# Patient Record
Sex: MALE | Race: WHITE | NOT HISPANIC OR LATINO | Employment: UNEMPLOYED | URBAN - METROPOLITAN AREA
[De-identification: names, ages, dates, MRNs, and addresses within clinical notes are randomized per-mention and may not be internally consistent; named-entity substitution may affect disease eponyms.]

---

## 2017-01-24 ENCOUNTER — TELEPHONE (OUTPATIENT)
Dept: MEDICAL GROUP | Age: 27
End: 2017-01-24

## 2017-01-24 NOTE — TELEPHONE ENCOUNTER
1. Caller Name: niels aguirre                                         Call Back Number: [phone] (home)         Patient approves a detailed voicemail message: no    Patients mom left a message stating that she wants a new order for son to have new labwork since he ate before his last one. I do see labs that are not completed in the computer and asked mom to call back to see if these are the ones that she is needing

## 2017-01-24 NOTE — Clinical Note
January 31, 2017        Jam Youngblood      Please give our office a call at [phone]. We have made several attempts to contact regarding lab work that you need done. Please call at your earliest convence.                      Re Gibbs, Medical Assistant

## 2017-01-25 ENCOUNTER — HOSPITAL ENCOUNTER (OUTPATIENT)
Dept: LAB | Facility: MEDICAL CENTER | Age: 27
End: 2017-01-25
Attending: FAMILY MEDICINE
Payer: COMMERCIAL

## 2017-01-25 DIAGNOSIS — R73.01 ELEVATED FASTING GLUCOSE: ICD-10-CM

## 2017-01-25 DIAGNOSIS — E78.00 PURE HYPERCHOLESTEROLEMIA: ICD-10-CM

## 2017-01-25 DIAGNOSIS — E55.9 VITAMIN D DEFICIENCY: ICD-10-CM

## 2017-01-25 LAB
25(OH)D3 SERPL-MCNC: 24 NG/ML (ref 30–100)
ALBUMIN SERPL BCP-MCNC: 4.9 G/DL (ref 3.2–4.9)
ALBUMIN/GLOB SERPL: 1.8 G/DL
ALP SERPL-CCNC: 57 U/L (ref 30–99)
ALT SERPL-CCNC: 26 U/L (ref 2–50)
ANION GAP SERPL CALC-SCNC: 8 MMOL/L (ref 0–11.9)
AST SERPL-CCNC: 17 U/L (ref 12–45)
BILIRUB SERPL-MCNC: 0.8 MG/DL (ref 0.1–1.5)
BUN SERPL-MCNC: 14 MG/DL (ref 8–22)
CALCIUM SERPL-MCNC: 10 MG/DL (ref 8.5–10.5)
CHLORIDE SERPL-SCNC: 106 MMOL/L (ref 96–112)
CHOLEST SERPL-MCNC: 199 MG/DL (ref 100–199)
CO2 SERPL-SCNC: 27 MMOL/L (ref 20–33)
CREAT SERPL-MCNC: 0.97 MG/DL (ref 0.5–1.4)
GLOBULIN SER CALC-MCNC: 2.8 G/DL (ref 1.9–3.5)
GLUCOSE SERPL-MCNC: 92 MG/DL (ref 65–99)
HDLC SERPL-MCNC: 51 MG/DL
LDLC SERPL CALC-MCNC: 128 MG/DL
POTASSIUM SERPL-SCNC: 4.1 MMOL/L (ref 3.6–5.5)
PROT SERPL-MCNC: 7.7 G/DL (ref 6–8.2)
SODIUM SERPL-SCNC: 141 MMOL/L (ref 135–145)
TRIGL SERPL-MCNC: 100 MG/DL (ref 0–149)

## 2017-01-25 PROCEDURE — 36415 COLL VENOUS BLD VENIPUNCTURE: CPT

## 2017-01-25 PROCEDURE — 82306 VITAMIN D 25 HYDROXY: CPT

## 2017-01-25 PROCEDURE — 80053 COMPREHEN METABOLIC PANEL: CPT

## 2017-01-25 PROCEDURE — 80061 LIPID PANEL: CPT

## 2017-01-25 NOTE — TELEPHONE ENCOUNTER
Phone Number Called: [phone] (home)       Message: left message to call back and explain that there are future labs in the computer and did she want some other kind of lab work    Left Message for patient to call back: yes

## 2017-01-26 NOTE — TELEPHONE ENCOUNTER
Phone Number Called: [phone] (home)     Message: called pt left message for pt to call back.     Left Message for patient to call back: yes

## 2017-01-30 ENCOUNTER — TELEPHONE (OUTPATIENT)
Dept: MEDICAL GROUP | Age: 27
End: 2017-01-30

## 2017-01-30 DIAGNOSIS — E55.9 VITAMIN D DEFICIENCY: ICD-10-CM

## 2017-01-30 NOTE — TELEPHONE ENCOUNTER
----- Message from Brian Villalobos M.D. sent at 1/30/2017  7:04 AM PST -----  Hi Jam,     All of your labs were normal except for Vitamin D, which was low. I  recommend starting vitamin D supplementation.  I've ordered the supplement.    Yonatan Villalobos MD  [facility]  [address] [place] [id number]

## 2017-01-30 NOTE — TELEPHONE ENCOUNTER
Phone Number Called: [phone] (home)     Message: Left message for the patient to call us back regarding the note below.      Left Message for patient to call back: yes

## 2017-01-30 NOTE — TELEPHONE ENCOUNTER
Phone Number Called: [phone] (home)     Message: Left message for pt;s mother, Allison, to call back.    Left Message for patient to call back: yes

## 2017-01-31 NOTE — TELEPHONE ENCOUNTER
Phone Number Called: [phone] (home)     Message: Left message for the patient to call us back regarding the note below.      Left Message for patient to call back: yes

## 2017-02-01 NOTE — TELEPHONE ENCOUNTER
Phone Number Called: [phone] (home)     Message: Left message for the patient to call us back regarding the note below.      Left Message for patient to call back: yes

## 2017-02-01 NOTE — TELEPHONE ENCOUNTER
HAILEE ONLY    Phone Number Called: [phone] (home)       Message: Spoke with patient's mother and let them know Brian Villalobos M.D.'s message.    Left Message for patient to call back: N\A

## 2018-02-01 ENCOUNTER — TELEPHONE (OUTPATIENT)
Dept: MEDICAL GROUP | Age: 28
End: 2018-02-01

## 2018-02-01 NOTE — TELEPHONE ENCOUNTER
1. Caller Name: Allison Youngblood (pt mother)                                         Call Back Number: [phone]      Patient approves a detailed voicemail message: N\A    Patient's mother called regarding a disability appeal they are going through for Jam, she would Dr. Villalobos to write a letter stating that patient was diagnosed with bipolar disorder and anything else in addition that can be stated on the letter as evidence to help with the disability appeal.

## 2018-02-02 ENCOUNTER — OFFICE VISIT (OUTPATIENT)
Dept: MEDICAL GROUP | Age: 28
End: 2018-02-02
Payer: COMMERCIAL

## 2018-02-02 ENCOUNTER — HOSPITAL ENCOUNTER (OUTPATIENT)
Dept: RADIOLOGY | Facility: MEDICAL CENTER | Age: 28
End: 2018-02-02
Attending: FAMILY MEDICINE
Payer: COMMERCIAL

## 2018-02-02 VITALS
WEIGHT: 151.4 LBS | HEIGHT: 68 IN | BODY MASS INDEX: 22.94 KG/M2 | SYSTOLIC BLOOD PRESSURE: 104 MMHG | OXYGEN SATURATION: 95 % | HEART RATE: 80 BPM | TEMPERATURE: 98.1 F | DIASTOLIC BLOOD PRESSURE: 60 MMHG

## 2018-02-02 DIAGNOSIS — F31.9 BIPOLAR DISEASE, CHRONIC (HCC): ICD-10-CM

## 2018-02-02 DIAGNOSIS — Z23 NEED FOR VACCINATION: ICD-10-CM

## 2018-02-02 DIAGNOSIS — R07.81 RIB PAIN ON LEFT SIDE: ICD-10-CM

## 2018-02-02 DIAGNOSIS — K30 DELAYED GASTRIC EMPTYING: ICD-10-CM

## 2018-02-02 PROCEDURE — 99214 OFFICE O/P EST MOD 30 MIN: CPT | Mod: 25 | Performed by: FAMILY MEDICINE

## 2018-02-02 PROCEDURE — 90471 IMMUNIZATION ADMIN: CPT | Performed by: FAMILY MEDICINE

## 2018-02-02 PROCEDURE — 90632 HEPA VACCINE ADULT IM: CPT | Performed by: FAMILY MEDICINE

## 2018-02-02 PROCEDURE — 90472 IMMUNIZATION ADMIN EACH ADD: CPT | Performed by: FAMILY MEDICINE

## 2018-02-02 PROCEDURE — 90715 TDAP VACCINE 7 YRS/> IM: CPT | Performed by: FAMILY MEDICINE

## 2018-02-02 PROCEDURE — 90746 HEPB VACCINE 3 DOSE ADULT IM: CPT | Performed by: FAMILY MEDICINE

## 2018-02-02 PROCEDURE — 71111 X-RAY EXAM RIBS/CHEST4/> VWS: CPT

## 2018-02-02 NOTE — ASSESSMENT & PLAN NOTE
New problem    Patient is a 27-year-old male who states that he's been having sharp pain under his left rib cage for the past week or so which radiates down his left side into his hip. The pain is sharp 3 out of 10 radiates down to his left hip. There is no history of trauma. He denies any breathing difficulties, no shortness of breath, no dyspnea on exertion

## 2018-02-02 NOTE — PROGRESS NOTES
This medical record contains text that has been entered with the assistance of computer voice recognition and dictation software.  Therefore, it may contain unintended errors in text, spelling, punctuation, or grammar    Chief Complaint   Patient presents with   • Other     see reason for visit       Jam Youngblood is a 27 y.o. male here evaluation and management of: Left rib cage pain, bipolar disorder      HPI:     Bipolar disease, chronic (CMS-HCC)  Currently restarted Fluoxetine 10mg by psychiatry  History cultures is not certain that this is bipolar disease  He has been complaining for fear of going outside in open areas. Some days he can walk 10 feet outside the house then he'll feel panic and went back inside. He feels safer and less anxious and smaller closed environments.    Rib pain on left side  New problem    Patient is a 27-year-old male who states that he's been having sharp pain under his left rib cage for the past week or so which radiates down his left side into his hip. The pain is sharp 3 out of 10 radiates down to his left hip. There is no history of trauma. He denies any breathing difficulties, no shortness of breath, no dyspnea on exertion    Current medicines (including changes today)  Current Outpatient Prescriptions   Medication Sig Dispense Refill   • FLUOXETINE HCL PO Take  by mouth.     • NON SPECIFIED Please dispense Jamp-Domperidone  10mg tid 180 Each 2   • vitamin D (CHOLECALCIFEROL) 1000 UNIT Tab Take 1 Tab by mouth every day. 90 Tab 2   • hydrOXYzine (ATARAX) 25 MG TABS Take 1-2 Tabs by mouth 3 times a day as needed for Anxiety. 30 Tab 1   • ondansetron (ZOFRAN) 4 MG TABS Take 1 Tab by mouth every 8 hours as needed for Nausea/Vomiting. 30 Tab 0   • promethazine (PHENERGAN) 25 MG TABS Take 1 Tab by mouth every 8 hours as needed for Nausea/Vomiting. 20 Each 0   • omeprazole (PRILOSEC) 40 MG capsule Take 1 Cap by mouth every day. 30 Each 2     No current facility-administered medications  "for this visit.      He  has a past medical history of Depression. He also has no past medical history of Anemia; Anxiety; GERD (gastroesophageal reflux disease); or Hypertension.  He  has a past surgical history that includes hydrocelectomy child; endoscopy; and dental extraction(s).  Social History   Substance Use Topics   • Smoking status: Never Smoker   • Smokeless tobacco: Never Used   • Alcohol use No     Social History     Social History Narrative   • No narrative on file     Family History   Problem Relation Age of Onset   • Hypertension Father    • Psychiatry Mother    • Cancer Neg Hx    • Diabetes Neg Hx    • Hyperlipidemia Neg Hx      Family Status   Relation Status   • Father Alive   • Mother Alive   • Sister Alive   • Brother Alive   • Neg Hx          ROS    Please see hpi     All other systems reviewed and are negative     Objective:     Blood pressure 104/60, pulse 80, temperature 36.7 °C (98.1 °F), height 1.727 m (5' 7.99\"), weight 68.7 kg (151 lb 6.4 oz), SpO2 95 %. Body mass index is 23.03 kg/m².  Physical Exam:    Constitutional: Alert, no distress.  Skin: Warm, dry, good turgor, no rashes in visible areas.  Eye: Equal, round and reactive, conjunctiva clear, lids normal.  ENMT: Lips without lesions, good dentition, oropharynx clear.  Neck: Trachea midline, no masses, no thyromegaly. No cervical or supraclavicular lymphadenopathy.  Respiratory: Unlabored respiratory effort, lungs clear to auscultation, no wheezes, no ronchi.  Cardiovascular: Normal S1, S2, no murmur, no edema.  Abdomen: Soft, non-tender, no masses, no hepatosplenomegaly.  Psych: Alert and oriented x3, normal affect and mood.          Assessment and Plan:   The following treatment plan was discussed      1. Bipolar disease, chronic (CMS-HCC)  Managed by psychiatry  Currently deciding diagnosis    2. Delayed gastric emptying      - NON SPECIFIED; Please dispense Jamp-Domperidone  10mg tid  Dispense: 180 Each; Refill: 2  - BASIC " METABOLIC PANEL; Future    3. Rib pain on left side    Patient was instructed on activity modification ×2 weeks  NSAIDs when necessary  Ice when necessary and compression    - [id number] (WITH 1-VIEW CXR); Future    4. Need for vaccination    Given today    - HEPATITIS A VACCINE ADULT IM  - HEPATITIS B VACCINE ADULT IM  - TDAP VACCINE =>8YO IM        HEALTH MAINTENANCE:    Instructed to Follow up in clinic or ER for worsening symptoms, difficulty breathing, lack of expected recovery, or should new symptoms or problems arise.    Followup: Return in about 2 months (around 4/2/2018) for Reevaluation.       Once again this medical record contains text that has been entered with the assistance of computer voice recognition and dictation software.  Therefore, it may contain unintended errors in text, spelling, punctuation, or grammar

## 2018-02-02 NOTE — ASSESSMENT & PLAN NOTE
Currently restarted Fluoxetine 10mg by psychiatry  History cultures is not certain that this is bipolar disease  He has been complaining for fear of going outside in open areas. Some days he can walk 10 feet outside the house then he'll feel panic and went back inside. He feels safer and less anxious and smaller closed environments.

## 2018-02-06 NOTE — TELEPHONE ENCOUNTER
Yonatan Torres M.D.  [facility] [address] [facility]   Caller: Unspecified (5 days ago, 10:14 AM)             We discussed this with Jam and father, this should come from his psychiatrist

## 2018-02-08 ENCOUNTER — TELEPHONE (OUTPATIENT)
Dept: MEDICAL GROUP | Age: 28
End: 2018-02-08

## 2018-02-09 NOTE — TELEPHONE ENCOUNTER
1. Caller Name: Jam Youngblood                                         Call Back Number: [phone] (home)         Patient approves a detailed voicemail message: N\A    Patient called he would like to know his x-ray results from 2/2/18

## 2018-02-15 NOTE — TELEPHONE ENCOUNTER
Yonatan Torres M.D.  [facility] [address] [facility]   Caller: Unspecified (1 week ago,  4:42 PM)               Normal Xray, no fractures.

## 2018-02-15 NOTE — TELEPHONE ENCOUNTER
Phone Number Called: [phone] (home)     Message: Pt informed.    Left Message for patient to call back: yes

## 2018-02-23 ENCOUNTER — APPOINTMENT (OUTPATIENT)
Dept: MEDICAL GROUP | Age: 28
End: 2018-02-23
Payer: COMMERCIAL

## 2018-02-23 ENCOUNTER — TELEPHONE (OUTPATIENT)
Dept: MEDICAL GROUP | Age: 28
End: 2018-02-23

## 2018-02-23 DIAGNOSIS — Z23 NEED FOR VACCINATION: ICD-10-CM

## 2018-02-23 DIAGNOSIS — K30 DELAYED GASTRIC EMPTYING: ICD-10-CM

## 2018-02-23 NOTE — TELEPHONE ENCOUNTER
Patient is on the MA Schedule today for Hep B vaccine/injection.    SPECIFIC Action To Be Taken: Orders pending, please sign.

## 2018-03-01 ENCOUNTER — TELEPHONE (OUTPATIENT)
Dept: MEDICAL GROUP | Age: 28
End: 2018-03-01

## 2018-03-09 ENCOUNTER — TELEPHONE (OUTPATIENT)
Dept: MEDICAL GROUP | Age: 28
End: 2018-03-09

## 2018-03-09 NOTE — TELEPHONE ENCOUNTER
1. Caller Name: Allison Youngblood (mom)                      Call Back Number: [phone]    2. Message: Pt's mother called to ask if you would fill out a disability questionnaire from her  for her son. Would like to know if she can just drop off or if you need to have him in for an appt. Pt is losing his ins at the end of this month and would like it done as soon as possible. Please advise.     3. Patient approves office to leave a detailed voicemail/MyChart message: no

## 2018-03-14 NOTE — TELEPHONE ENCOUNTER
Phone Number Called: [phone] (home)     Message: Left message for the patient to call us back regarding the note below.    Left Message for patient to call back: yes

## 2018-03-15 NOTE — TELEPHONE ENCOUNTER
VOICEMAIL  1. Caller Name: Allison Youngblood (mom)                      Call Back Number: [phone]     2. Message: PT's mother has some paperwork to fill out regarding disability paperwork for Pt.  Pt's mother is wondering if provider can fill it out if she drops it off or if she needs to make an appt.  PT will lose health insurance at the end of the month and needs paperwork done before then.    3. Patient approves office to leave a detailed voicemail/MyChart message: yes

## 2018-08-22 ENCOUNTER — TELEPHONE (OUTPATIENT)
Dept: MEDICAL GROUP | Age: 28
End: 2018-08-22

## 2018-08-22 NOTE — TELEPHONE ENCOUNTER
1. Caller Name: Allison Youngblood (Mother)                                          Call Back Number: [phone] (home)     Patients mother called and stated patient is experiencing some swelling on hands and hives on the inside of patients mouth and around his face. NO fever, NO SOB, but patient is experiencing a lot of anxiety. Patients mother wants to know if he needs to be seen for some medication to help with hives and swelling.      Please advise

## 2018-08-22 NOTE — TELEPHONE ENCOUNTER
Phone Number Called: Allison (mother) [phone] (home)       Message:Called and LVM regarding message below.     Left Message for patient to call back: yes

## 2018-08-22 NOTE — TELEPHONE ENCOUNTER
1. Caller Name: Allison (mother)                                           Call Back Number: [phone] (home)      Patients mother called back and is aware of message. Patient is scheduled for next week 08/29/18 and will follow up with us or urgent care for any further symptoms     HAILEE

## 2018-08-22 NOTE — TELEPHONE ENCOUNTER
He can take benadryl until he is able to be seen.  If there are any breathing issues, go to the ER.

## 2018-08-29 ENCOUNTER — APPOINTMENT (OUTPATIENT)
Dept: MEDICAL GROUP | Age: 28
End: 2018-08-29

## 2019-01-30 ENCOUNTER — OFFICE VISIT (OUTPATIENT)
Dept: MEDICAL GROUP | Age: 29
End: 2019-01-30
Payer: COMMERCIAL

## 2019-01-30 VITALS
HEIGHT: 68 IN | WEIGHT: 161 LBS | BODY MASS INDEX: 24.4 KG/M2 | HEART RATE: 62 BPM | DIASTOLIC BLOOD PRESSURE: 60 MMHG | OXYGEN SATURATION: 97 % | SYSTOLIC BLOOD PRESSURE: 112 MMHG | TEMPERATURE: 98.7 F

## 2019-01-30 DIAGNOSIS — J30.9 CHRONIC ALLERGIC RHINITIS: ICD-10-CM

## 2019-01-30 DIAGNOSIS — Z23 NEED FOR VACCINATION: ICD-10-CM

## 2019-01-30 PROCEDURE — 90471 IMMUNIZATION ADMIN: CPT | Performed by: INTERNAL MEDICINE

## 2019-01-30 PROCEDURE — 90686 IIV4 VACC NO PRSV 0.5 ML IM: CPT | Performed by: INTERNAL MEDICINE

## 2019-01-30 PROCEDURE — 99203 OFFICE O/P NEW LOW 30 MIN: CPT | Mod: 25 | Performed by: INTERNAL MEDICINE

## 2019-01-30 RX ORDER — TRIAMCINOLONE ACETONIDE 40 MG/ML
40 INJECTION, SUSPENSION INTRA-ARTICULAR; INTRAMUSCULAR ONCE
Status: COMPLETED | OUTPATIENT
Start: 2019-01-30 | End: 2019-01-30

## 2019-01-30 RX ORDER — FLUTICASONE PROPIONATE 50 MCG
SPRAY, SUSPENSION (ML) NASAL
Qty: 1 BOTTLE | Refills: 11 | Status: SHIPPED | OUTPATIENT
Start: 2019-01-30 | End: 2020-10-21 | Stop reason: SDUPTHER

## 2019-01-30 RX ADMIN — TRIAMCINOLONE ACETONIDE 40 MG: 40 INJECTION, SUSPENSION INTRA-ARTICULAR; INTRAMUSCULAR at 15:52

## 2019-01-30 ASSESSMENT — ENCOUNTER SYMPTOMS
CONSTITUTIONAL NEGATIVE: 1
GASTROINTESTINAL NEGATIVE: 1
MUSCULOSKELETAL NEGATIVE: 1
EYES NEGATIVE: 1
PSYCHIATRIC NEGATIVE: 1
COUGH: 1
NEUROLOGICAL NEGATIVE: 1
CARDIOVASCULAR NEGATIVE: 1
SORE THROAT: 1

## 2019-01-30 ASSESSMENT — PATIENT HEALTH QUESTIONNAIRE - PHQ9
SUM OF ALL RESPONSES TO PHQ QUESTIONS 1-9: 9
5. POOR APPETITE OR OVEREATING: 0 - NOT AT ALL
CLINICAL INTERPRETATION OF PHQ2 SCORE: 2

## 2019-01-30 NOTE — PROGRESS NOTES
Subjective:      Jam Youngblood is a 28 y.o. male who presents with Allergic Rhinitis (kenolog shot)        HPI    The patient is here for followup of chronic medical problems listed below. The patient is compliant with medications and having no side effects from them. Denies chest pain, abdominal pain, dyspnea, myalgias, or cough.    Patient complains of allergies with runny nose, cough, and sore throat. He states that normally he gets them in the spring and they resolve on their own with oral medication, but the past two years they have been year round. He is requesting Kenalog shot.    Patient is requesting influenza vaccine.    Patient Active Problem List   Diagnosis   • Delayed gastric emptying   • Bipolar disease, chronic (HCC)   • Preventative health care   • Rib pain on left side   • Need for vaccination   • Chronic allergic rhinitis       Outpatient Medications Prior to Visit   Medication Sig Dispense Refill   • FLUOXETINE HCL PO Take  by mouth.     • vitamin D (CHOLECALCIFEROL) 1000 UNIT Tab Take 1 Tab by mouth every day. 90 Tab 2   • NON SPECIFIED Please dispense Jamp-Domperidone  10mg tid (Patient not taking: Reported on 1/30/2019) 180 Each 0   • hydrOXYzine (ATARAX) 25 MG TABS Take 1-2 Tabs by mouth 3 times a day as needed for Anxiety. (Patient not taking: Reported on 1/30/2019) 30 Tab 1   • ondansetron (ZOFRAN) 4 MG TABS Take 1 Tab by mouth every 8 hours as needed for Nausea/Vomiting. (Patient not taking: Reported on 1/30/2019) 30 Tab 0   • promethazine (PHENERGAN) 25 MG TABS Take 1 Tab by mouth every 8 hours as needed for Nausea/Vomiting. (Patient not taking: Reported on 1/30/2019) 20 Each 0   • omeprazole (PRILOSEC) 40 MG capsule Take 1 Cap by mouth every day. (Patient not taking: Reported on 1/30/2019) 30 Each 2     No facility-administered medications prior to visit.         No Known Allergies    Review of Systems   Constitutional: Negative.    HENT: Positive for congestion and sore throat.    Eyes:  "Negative.    Respiratory: Positive for cough.    Cardiovascular: Negative.    Gastrointestinal: Negative.    Genitourinary: Negative.    Musculoskeletal: Negative.    Skin: Negative.    Neurological: Negative.    Endo/Heme/Allergies: Negative.    Psychiatric/Behavioral: Negative.    All other systems reviewed and are negative.           Objective:     /60   Pulse 62   Temp 37.1 °C (98.7 °F)   Ht 1.727 m (5' 8\")   Wt 73 kg (161 lb)   SpO2 97%   BMI 24.48 kg/m²     Physical Exam   Constitutional: Oriented to person, place, and time. Appears well-developed and well-nourished. No distress.   Head: Normocephalic and atraumatic.   Right Ear: External ear normal.   Left Ear: External ear normal.   Nose: Nose normal.   Mouth/Throat: Oropharynx is clear and moist. No oropharyngeal exudate.   Eyes: Pupils are equal, round, and reactive to light. Conjunctivae and EOM are normal. Right eye exhibits no discharge. Left eye exhibits no discharge. No scleral icterus.   Neck: Normal range of motion. Neck supple. No JVD present. No tracheal deviation present. No thyromegaly present.   Cardiovascular: Normal rate, regular rhythm, normal heart sounds and intact distal pulses.  Exam reveals no gallop and no friction rub.    No murmur heard.  Pulmonary/Chest: Effort normal. No stridor. No respiratory distress. No wheezing or rales. No tenderness.   Abdominal: Soft. Bowel sounds are normal. No distension and no mass. There is no tenderness. There is no rebound and no guarding. No hernia.   Musculoskeletal: Normal range of motion No edema or tenderness.   Lymphadenopathy: No cervical adenopathy.   Neurological: Alert and oriented to person, place, and time. Normal reflexes. Normal reflexes. No cranial nerve deficit. Normal muscle tone. Coordination normal.   Skin: Skin is warm and dry. No rash noted. Not diaphoretic. No erythema. No pallor.   Psychiatric: Normal mood and affect. Behavior is normal. Judgment and thought content " normal.   Nursing note and vitals reviewed.      Lab Results   Component Value Date/Time    HBA1C 5.2 10/29/2012 12:44 PM     Lab Results   Component Value Date/Time    SODIUM 141 01/25/2017 10:11 AM    POTASSIUM 4.1 01/25/2017 10:11 AM    CHLORIDE 106 01/25/2017 10:11 AM    CO2 27 01/25/2017 10:11 AM    GLUCOSE 92 01/25/2017 10:11 AM    BUN 14 01/25/2017 10:11 AM    CREATININE 0.97 01/25/2017 10:11 AM    ALKPHOSPHAT 57 01/25/2017 10:11 AM    ASTSGOT 17 01/25/2017 10:11 AM    ALTSGPT 26 01/25/2017 10:11 AM    TBILIRUBIN 0.8 01/25/2017 10:11 AM     No results found for: INR  Lab Results   Component Value Date/Time    CHOLSTRLTOT 199 01/25/2017 10:11 AM     (H) 01/25/2017 10:11 AM    HDL 51 01/25/2017 10:11 AM    TRIGLYCERIDE 100 01/25/2017 10:11 AM       No results found for: TESTOSTERONE  No results found for: TSH  Lab Results   Component Value Date/Time    FREET4 0.77 12/14/2016 11:30 AM     No results found for: URICACID  No components found for: VITB12  Lab Results   Component Value Date/Time    25HYDROXY 24 (L) 01/25/2017 10:11 AM          Assessment/Plan:     1. Need for vaccination  - Influenza Vaccine Quad Injection >3Y (PF)    2. Chronic allergic rhinitis  Normally seasonally, but patient states they have been more severe the past two years. Plan to treat with:    - triamcinolone acetonide (KENALOG-40) injection 40 mg; 1 mL by Intramuscular route Once.  - fluticasone (FLONASE) 50 MCG/ACT nasal spray; 2 sprays in each nostril qd  Dispense: 1 Bottle; Refill: 11      30 minute face-to-face encounter took place today.  More than half of this time was spent in the coordination of care of the above problems, as well as counseling.     Dorian JACKSON (Donaldo), am scribing for, and in the presence of, Papo Grimaldo M.D..    Electronically signed by: Dorian Burgess (Scribe), 1/30/2019    Papo JACKSON M.D., personally performed the services described in this documentation, as scribed by  Dorian Burgess in my presence, and it is both accurate and complete.

## 2019-08-07 ENCOUNTER — OFFICE VISIT (OUTPATIENT)
Dept: MEDICAL GROUP | Age: 29
End: 2019-08-07
Payer: COMMERCIAL

## 2019-08-07 VITALS
BODY MASS INDEX: 24.71 KG/M2 | OXYGEN SATURATION: 96 % | DIASTOLIC BLOOD PRESSURE: 62 MMHG | HEART RATE: 67 BPM | HEIGHT: 69 IN | WEIGHT: 166.8 LBS | TEMPERATURE: 99.1 F | SYSTOLIC BLOOD PRESSURE: 110 MMHG

## 2019-08-07 DIAGNOSIS — H00.011 HORDEOLUM EXTERNUM OF RIGHT UPPER EYELID: ICD-10-CM

## 2019-08-07 DIAGNOSIS — F40.10 SOCIAL ANXIETY DISORDER: ICD-10-CM

## 2019-08-07 DIAGNOSIS — H83.3X1 NOISE-INDUCED HEARING LOSS OF RIGHT EAR WITH RESTRICTED HEARING OF LEFT EAR: ICD-10-CM

## 2019-08-07 DIAGNOSIS — Z23 NEED FOR VACCINATION: Primary | ICD-10-CM

## 2019-08-07 PROBLEM — H91.91 HEARING LOSS OF RIGHT EAR: Status: ACTIVE | Noted: 2019-08-07

## 2019-08-07 PROCEDURE — 99214 OFFICE O/P EST MOD 30 MIN: CPT | Performed by: FAMILY MEDICINE

## 2019-08-07 RX ORDER — PREDNISONE 20 MG/1
TABLET ORAL
Qty: 12 TAB | Refills: 0 | Status: SHIPPED | OUTPATIENT
Start: 2019-08-07 | End: 2020-10-21

## 2019-08-07 RX ORDER — AMOXICILLIN 875 MG/1
875 TABLET, COATED ORAL 2 TIMES DAILY
Qty: 14 TAB | Refills: 0 | Status: SHIPPED | OUTPATIENT
Start: 2019-08-07 | End: 2020-10-21

## 2019-08-07 NOTE — PROGRESS NOTES
This medical record contains text that has been entered with the assistance of computer voice recognition and dictation software.  Therefore, it may contain unintended errors in text, spelling, punctuation, or grammar    Chief Complaint   Patient presents with   • Other     lump in right eye x1 week    • Other     blood in right ear and loss of hearing   • Anxiety     pt has sharp pains in back when anxiety occurs          Jam Youngblood is a 29 y.o. 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 He frequently wears headphones and his old earphones were going out on the right side, however when he got new headphones he still noted faint sound on the right compared to left. He additionally has noted some blood in his right ear canal. He asserts ear discomfort, hearing loss, and blood from right ear canal. He denies any fever or chills.  He denies any headaches, no changes in vision, no ringing in the ear, no fevers, no chills, no night sweats.      Hordeolum externum of right upper eyelid  NEW PROBLEM    Patient has recently noted a lump on his right upper eyelid. He has tried icing the lump at home without resolution. He has recently noticed growth of additional masses on his upper eyelid. He denies any visual changes or drainage.  He denies any eye pain, no discharge from the eye, no headaches no nausea no vomiting.      Social anxiety disorder    Patient was previously being worked up for bipolar disease, however his psychiatrist is not sure if that is the diagnosis.  He believes that this social anxiety disorder/depression.  He has not returned to his psychiatrist in 6 months because the cost of co-pays. Patient reports ongoing anxiety and continues having difficulty leaving his house. He does have a dog and has  been leaving the house to walk his dog and attend appointments. He does report occasional manic episodes lasting 15-60 minutes, most recently occurring a few days ago. He does not currently have a job and states she is working on disability.  He has good support from his father whom he lives with and his brother.  He is currently working on disability.  He denies any suicidal homicidal ideations, no change in sleep or appetite.      Current medicines (including changes today)  Current Outpatient Medications   Medication Sig Dispense Refill   • amoxicillin (AMOXIL) 875 MG tablet Take 1 Tab by mouth 2 times a day. 14 Tab 0   • predniSONE (DELTASONE) 20 MG Tab Take 3 tabs po for 2 days then 2 tabs for 2 days then 1 for 2 days 12 Tab 0   • fluticasone (FLONASE) 50 MCG/ACT nasal spray 2 sprays in each nostril qd 1 Bottle 11   • FLUOXETINE HCL PO Take  by mouth.     • vitamin D (CHOLECALCIFEROL) 1000 UNIT Tab Take 1 Tab by mouth every day. 90 Tab 2     No current facility-administered medications for this visit.      He  has a past medical history of Depression and Social anxiety disorder (8/7/2019). He also has no past medical history of Anemia, GERD (gastroesophageal reflux disease), or Hypertension.  He  has a past surgical history that includes hydrocelectomy child; endoscopy; and dental extraction(s).  Social History     Tobacco Use   • Smoking status: Never Smoker   • Smokeless tobacco: Never Used   Substance Use Topics   • Alcohol use: No   • Drug use: No     Social History     Social History Narrative   • Not on file     Family History   Problem Relation Age of Onset   • Hypertension Father    • Psychiatric Illness Mother    • Cancer Neg Hx    • Diabetes Neg Hx    • Hyperlipidemia Neg Hx      Family Status   Relation Name Status   • Fa  Alive   • Mo  Alive   • Sis  Alive   • Bro  Alive   • Neg Hx  (Not Specified)         ROS    Please see hpi     All other systems reviewed and are negative     Objective:     BP  "110/62 (BP Location: Left arm, Patient Position: Sitting, BP Cuff Size: Adult)   Pulse 67   Temp 37.3 °C (99.1 °F) (Temporal)   Ht 1.753 m (5' 9\")   Wt 75.7 kg (166 lb 12.8 oz)   SpO2 96%  Body mass index is 24.63 kg/m².  Physical Exam:    Constitutional: Alert, no distress.  Skin: Warm, dry, good turgor, no rashes in visible areas.  Eye: Right eye: Reveals a 0.3 mm nodular lesion with mild erythema, equal, round and reactive, conjunctiva clear, lids normal.  ENMT: Lips without lesions, good dentition, oropharynx clear.  Neck: Trachea midline, no masses, no thyromegaly. No cervical or supraclavicular lymphadenopathy.  Respiratory: Unlabored respiratory effort, lungs clear to auscultation, no wheezes, no ronchi.  Cardiovascular: Normal S1, S2, no murmur, no edema.  Psych: Alert and oriented x3, normal affect and mood.      Assessment and Plan:   The following treatment plan was discussed:    1. Social anxiety disorder    Patient was encouraged to re-establish with psychology and referral was placed today.     He may continue Fluoxetine HCl PO QD as previously prescribed by his psychiatrist.  [facility]  He denies any suicidal homicidal ideations.    - REFERRAL TO PSYCHOLOGY        2. 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     Due to current infection, patient was initiated on 7 day course of amoxicillin 875 mg BID and course of prednisone 20 mg as outlined below.    - amoxicillin (AMOXIL) 875 MG tablet; Take 1 Tab by mouth 2 times a day.  Dispense: 14 Tab; Refill: 0  - predniSONE (DELTASONE) 20 MG Tab; Take 3 tabs po for 2 days then 2 tabs for 2 days then 1 for 2 days  Dispense: 12 Tab; Refill: 0  - REFERRAL TO ENT      3. Hordeolum externum of right upper eyelid    The patient was instructed use a warm-hot wet rag on his right upper eyelid for " the next 4-6 weeks.      If symptoms not resolved after 4-6 weeks, will place referral to opthalmology.     4. Need for vaccination    Patient is due for varicella vaccine, which he has requested to postpone at this time.    - Varicella Vaccine SQ        HEALTH MAINTENANCE:  - Due for varicella vaccine.     Instructed to Follow up in clinic or ER for worsening symptoms, difficulty breathing, lack of expected recovery, or should new symptoms or problems arise.    Follow up: Return in about 4 weeks (around 9/4/2019) for Reevaluation.      Once again this medical record contains text that has been entered with the assistance of computer voice recognition, dictation software, and medical scribes.  Therefore, it may contain unintended errors in text, spelling, punctuation, or grammar.    I, Katy Pool (Scribe), am scribing for, and in the presence of, Yonatan Villalobos M.D.    Electronically signed by: Katy Pool (Scribe), 8/7/2019     IYonatan M.D. personally performed the services described in this documentation, as scribed by Katy Pool in my presence, and it is both accurate and complete.

## 2020-10-21 ENCOUNTER — OFFICE VISIT (OUTPATIENT)
Dept: MEDICAL GROUP | Age: 30
End: 2020-10-21
Payer: COMMERCIAL

## 2020-10-21 VITALS
DIASTOLIC BLOOD PRESSURE: 72 MMHG | HEIGHT: 69 IN | BODY MASS INDEX: 26.07 KG/M2 | WEIGHT: 176 LBS | SYSTOLIC BLOOD PRESSURE: 110 MMHG | OXYGEN SATURATION: 95 % | TEMPERATURE: 99.8 F | HEART RATE: 85 BPM

## 2020-10-21 DIAGNOSIS — Z23 NEEDS FLU SHOT: ICD-10-CM

## 2020-10-21 DIAGNOSIS — J30.9 CHRONIC ALLERGIC RHINITIS: ICD-10-CM

## 2020-10-21 DIAGNOSIS — Z23 NEED FOR VACCINATION: ICD-10-CM

## 2020-10-21 DIAGNOSIS — Z00.00 ANNUAL PHYSICAL EXAM: ICD-10-CM

## 2020-10-21 PROCEDURE — 99395 PREV VISIT EST AGE 18-39: CPT | Mod: 25 | Performed by: FAMILY MEDICINE

## 2020-10-21 PROCEDURE — 90746 HEPB VACCINE 3 DOSE ADULT IM: CPT | Performed by: FAMILY MEDICINE

## 2020-10-21 PROCEDURE — 90472 IMMUNIZATION ADMIN EACH ADD: CPT | Performed by: FAMILY MEDICINE

## 2020-10-21 PROCEDURE — 90686 IIV4 VACC NO PRSV 0.5 ML IM: CPT | Performed by: FAMILY MEDICINE

## 2020-10-21 PROCEDURE — 90471 IMMUNIZATION ADMIN: CPT | Performed by: FAMILY MEDICINE

## 2020-10-21 RX ORDER — FLUTICASONE PROPIONATE 50 MCG
SPRAY, SUSPENSION (ML) NASAL
Qty: 16 G | Refills: 2 | Status: SHIPPED | OUTPATIENT
Start: 2020-10-21 | End: 2021-02-11 | Stop reason: SDUPTHER

## 2020-10-21 SDOH — HEALTH STABILITY: MENTAL HEALTH: HOW OFTEN DO YOU HAVE A DRINK CONTAINING ALCOHOL?: MONTHLY OR LESS

## 2021-02-03 ENCOUNTER — NURSE TRIAGE (OUTPATIENT)
Dept: HEALTH INFORMATION MANAGEMENT | Facility: OTHER | Age: 31
End: 2021-02-03

## 2021-02-04 NOTE — TELEPHONE ENCOUNTER
Additional Information  • Negative: SEVERE difficulty breathing (e.g., struggling for each breath, speaks in single words)  • Negative: Difficult to awaken or acting confused (e.g., disoriented, slurred speech)  • Negative: Bluish (or gray) lips or face now  • Negative: Shock suspected (e.g., cold/pale/clammy skin, too weak to stand, low BP, rapid pulse)  • Negative: Sounds like a life-threatening emergency to the triager  • Negative: [1] COVID-19 suspected (e.g., cough, fever, shortness of breath) AND [2] public health department recommends testing  • Negative: [1] COVID-19 exposure AND [2] no symptoms  • Negative: COVID-19 and Breastfeeding, questions about  • Negative: SEVERE or constant chest pain (Exception: mild central chest pain, present only when coughing)  • Negative: MODERATE difficulty breathing (e.g., speaks in phrases, SOB even at rest, pulse 100-120)  • Negative: MILD difficulty breathing (e.g., minimal/no SOB at rest, SOB with walking, pulse <100)  • Negative: Chest pain  • Negative: Patient sounds very sick or weak to the triager  • Negative: Fever > 103 F (39.4 C)  • Negative: [1] Fever > 101 F (38.3 C) AND [2] age > 60  • Negative: [1] Fever > 100.0 F (37.8 C) AND [2] bedridden (e.g., nursing home patient, CVA, chronic illness, recovering from surgery)  • Negative: HIGH RISK patient (e.g., age > 64 years, diabetes, heart or lung disease, weak immune system)  • Negative: Fever present > 3 days (72 hours)  • Negative: [1] Fever returns after gone for over 24 hours AND [2] symptoms worse or not improved  • Negative: [1] Continuous (nonstop) coughing interferes with work or school AND [2] no improvement using cough treatment per protocol  • Negative: Cough present > 3 weeks  • Negative: [1] COVID-19 infection diagnosed or suspected AND [2] mild symptoms (fever, cough) AND [3] no trouble breathing or other complications  • Negative: COVID-19, questions about  • Negative: COVID-19 Home Isolation,  "questions about  • Negative: COVID-19 Prevention and Healthy Living, questions about  • Negative: COVID-19 Testing, questions about    Answer Assessment - Initial Assessment Questions  1. COVID-19 DIAGNOSIS: \"Who made your Coronavirus (COVID-19) diagnosis?\" \"Was it confirmed by a positive lab test?\" If not diagnosed by a HCP, ask \"Are there lots of cases (community spread) where you live?\" (See public health department website, if unsure)    * MAJOR community spread: high number of cases; numbers of cases are increasing; many people hospitalized.    * MINOR community spread: low number of cases; not increasing; few or no people hospitalized      major  2. ONSET: \"When did the COVID-19 symptoms start?\"     01/06  3. WORST SYMPTOM: \"What is your worst symptom?\" (e.g., cough, fever, shortness of breath, muscle aches)      Fever 101F, muscle aches, cough   4. COUGH: \"How bad is the cough?\"      no  5. FEVER: \"Do you have a fever?\" If so, ask: \"What is your temperature, how was it measured, and when did it start?\"    none  6. RESPIRATORY STATUS: \"Describe your breathing?\" (e.g., shortness of breath, wheezing, unable to speak)   none  7. BETTER-SAME-WORSE: \"Are you getting better, staying the same or getting worse compared to yesterday?\"  If getting worse, ask, \"In what way?\"      better  8. HIGH RISK DISEASE: \"Do you have any chronic medical problems?\" (e.g., asthma, heart or lung disease, weak immune system, etc.  astham  9. PREGNANCY: \"Is there any chance you are pregnant?\" \"When was your last menstrual period?\"   no  10. OTHER SYMPTOMS: \"Do you have any other symptoms?\"  (e.g., runny nose, headache, sore throat, loss of smell)       Runny nose, loss of taste and smell    Protocols used: CORONAVIRUS (COVID-19) DIAGNOSED OR [id number]    "

## 2021-02-04 NOTE — TELEPHONE ENCOUNTER
1. Caller Name: Jam Youngblood                Call Back Number: [phone]  [facility] PCP or Specialty Provider: Yes Michele Sorensen        2.  Has the patient previously tested positive for COVID-19? Yes         Was the patient hospitalized due to COVID-19 or are they being scheduled for PFT? No         Has it been at least 14 days since date of symptom onset or positive test?covid Yes    Disposition: Confirmed patient appointment. Advised patient to call [phone] if anything changes prior to scheduled appointment.    Regarding:  Patient is experiencing COUGH.  ----- Message from Anne Zamarripa sent at 2/3/2021  4:55 PM PST -----  Patient'S MOTHER inbound TC to [facility] Scheduling to make an appointment with PCP. Patient is experiencing COUGH. PT.'S MOTHER DECLINED VV SINCE HE IS REQUESTING SECOND HEPATITUS SHOT. I encouraged patient to speak with Nurse Advice Team per upper respiratory protocol and patient agreed.

## 2021-02-11 ENCOUNTER — OFFICE VISIT (OUTPATIENT)
Dept: MEDICAL GROUP | Facility: MEDICAL CENTER | Age: 31
End: 2021-02-11
Attending: FAMILY MEDICINE
Payer: MEDICAID

## 2021-02-11 VITALS
SYSTOLIC BLOOD PRESSURE: 116 MMHG | HEIGHT: 68 IN | HEART RATE: 78 BPM | DIASTOLIC BLOOD PRESSURE: 74 MMHG | BODY MASS INDEX: 25.39 KG/M2 | OXYGEN SATURATION: 96 % | WEIGHT: 167.5 LBS | RESPIRATION RATE: 16 BRPM | TEMPERATURE: 98.2 F

## 2021-02-11 DIAGNOSIS — J30.9 CHRONIC ALLERGIC RHINITIS: ICD-10-CM

## 2021-02-11 DIAGNOSIS — Z23 NEED FOR VACCINATION: ICD-10-CM

## 2021-02-11 DIAGNOSIS — F31.0 BIPOLAR AFFECTIVE DISORDER, CURRENT EPISODE HYPOMANIC (HCC): ICD-10-CM

## 2021-02-11 DIAGNOSIS — F40.10 SOCIAL ANXIETY DISORDER: ICD-10-CM

## 2021-02-11 PROCEDURE — 99213 OFFICE O/P EST LOW 20 MIN: CPT | Performed by: FAMILY MEDICINE

## 2021-02-11 PROCEDURE — 99204 OFFICE O/P NEW MOD 45 MIN: CPT | Performed by: FAMILY MEDICINE

## 2021-02-11 PROCEDURE — 96372 THER/PROPH/DIAG INJ SC/IM: CPT | Performed by: FAMILY MEDICINE

## 2021-02-11 PROCEDURE — 700111 HCHG RX REV CODE 636 W/ 250 OVERRIDE (IP): Performed by: FAMILY MEDICINE

## 2021-02-11 RX ORDER — TRIAMCINOLONE ACETONIDE 40 MG/ML
60 INJECTION, SUSPENSION INTRA-ARTICULAR; INTRAMUSCULAR ONCE
Status: COMPLETED | OUTPATIENT
Start: 2021-02-11 | End: 2021-02-11

## 2021-02-11 RX ORDER — FLUTICASONE PROPIONATE 50 MCG
SPRAY, SUSPENSION (ML) NASAL
Qty: 16 G | Refills: 4 | Status: SHIPPED | OUTPATIENT
Start: 2021-02-11

## 2021-02-11 RX ADMIN — TRIAMCINOLONE ACETONIDE 60 MG: 40 INJECTION, SUSPENSION INTRA-ARTICULAR; INTRAMUSCULAR at 16:25

## 2021-02-12 NOTE — ASSESSMENT & PLAN NOTE
Review of records shows that patient received a single dose of hepatitis A vaccine in 2018 but did not receive a second dose.  He also received 2 doses of hepatitis B vaccine not receiving his third dose, last dose was in 2020.  Patient is currently not exposed to intravenous 3 drugs, and is not sexually active.  No history of jaundice or current abdominal pain

## 2021-02-12 NOTE — ASSESSMENT & PLAN NOTE
Patient reports chronic history of recurrent bilateral nasal congestion with clear nasal discharge.  This has been attributed to allergies in the past.  Has responded to occasional Kenalog injections in the past delivered IM.  Patient is already on fluticasone nasal spray as needed.  He denies any current wheezing or history of asthma.

## 2021-02-12 NOTE — PROGRESS NOTES
Chief Complaint   Patient presents with   • Establish Care         HISTORY OF THE PRESENT ILLNESS: Patient is a 30 y.o. male. This pleasant patient is here today establish care and discuss the problems below      Chronic allergic rhinitis  Patient reports chronic history of recurrent bilateral nasal congestion with clear nasal discharge.  This has been attributed to allergies in the past.  Has responded to occasional Kenalog injections in the past delivered IM.  Patient is already on fluticasone nasal spray as needed.  He denies any current wheezing or history of asthma.    Bipolar affective disorder, current episode hypomanic (HCC)  Patient reports a long history of psychiatric care being treated with numerous medications.  He does recall Prozac being one of them which seemed to irritate his mood.  Does not recall other medications.  He is currently seeing [facility]ccorm[facility] psychology.  Is open to seeing psychiatry.  Denies suicidal ideation, confusion.    Need for vaccination  Review of records shows that patient received a single dose of hepatitis A vaccine in 2018 but did not receive a second dose.  He also received 2 doses of hepatitis B vaccine not receiving his third dose, last dose was in 2020.  Patient is currently not exposed to intravenous 3 drugs, and is not sexually active.  No history of jaundice or current abdominal pain      Allergies: Patient has no known allergies.    Current Outpatient Medications Ordered in Epic   Medication Sig Dispense Refill   • fluticasone (FLONASE) 50 MCG/ACT nasal spray 2 sprays in each nostril qd 16 g 4     No current Epic-ordered facility-administered medications on file.       Past Medical History:   Diagnosis Date   • Depression     now on none and does not want to take any medications   • Social anxiety disorder 8/7/2019       Past Surgical History:   Procedure Laterality Date   • DENTAL EXTRACTION(S)      6 wisdom teeth and a few extra teeth pulled   • ENDOSCOPY   "    2012   • HYDROCELECTOMY CHILD         Social History     Tobacco Use   • Smoking status: Never Smoker   • Smokeless tobacco: Never Used   Substance Use Topics   • Alcohol use: Yes   • Drug use: No       Family Status   Relation Name Status   • Fa  Alive   • Mo  Alive   • Sis  Alive   • Bro  Alive   • Neg Hx  (Not Specified)     Family History   Problem Relation Age of Onset   • Hypertension Father    • Psychiatric Illness Mother    • Cancer Neg Hx    • Diabetes Neg Hx    • Hyperlipidemia Neg Hx        Review of Systems   Constitutional: Negative for fever, chills, weight loss and malaise/fatigue.   HENT: Negative for ear pain, nosebleeds,  sore throat and neck pain.    Eyes: Negative for blurred vision.   Respiratory: Negative for cough, sputum production, shortness of breath and wheezing.    Cardiovascular: Negative for chest pain, palpitations, orthopnea and leg swelling.   Gastrointestinal: Negative for heartburn, nausea, vomiting and abdominal pain.   Genitourinary: Negative for dysuria, urgency and frequency.   Musculoskeletal: Negative for myalgias, back pain and joint pain.   Skin: Negative for rash and itching.   Neurological: Negative for dizziness, tingling, tremors, sensory change, focal weakness and headaches.   Endo/Heme/Allergies: Does not bruise/bleed easily.   Psychiatric/Behavioral: Negative for depression, positive anxiety, positive memory loss.            Exam: /74   Pulse 78   Temp 36.8 °C (98.2 °F) (Temporal)   Resp 16   Ht 1.727 m (5' 8\")   Wt 76 kg (167 lb 8 oz)   SpO2 96%   General: Normal appearing. No distress.  HEENT: Normocephalic. Eyes conjunctiva clear lids without ptosis, pupils equal and reactive to light accommodation, ears normal shape and contour, canals are clear bilaterally, tympanic membranes are benign, nasal mucosa benign, oropharynx is without erythema, edema or exudates.   Neck: Supple without JVD or bruit. Thyroid is not enlarged.  Pulmonary: Clear to " ausculation.  Normal effort. No rales, ronchi, or wheezing.  Cardiovascular: Regular rate and rhythm without murmur. Carotid and radial pulses are intact and equal bilaterally.  Abdomen: Soft, nontender, nondistended. Normal bowel sounds. Liver and spleen are not palpable  Neurologic: Intact light touch and strength bilaterally,normal speech, no tremor, normal gait.   Lymph: No cervical, supraclavicular or axillary lymph nodes are palpable  Skin: Warm and dry.  No obvious lesions.  Musculoskeletal: Normal gait. No extremity cyanosis, clubbing, or edema.  Psych: Normal mood and affect. Alert and oriented x3. Judgment and insight is normal.    Please note that this dictation was created using voice recognition software. I have made every reasonable attempt to correct obvious errors, but I expect that there are errors of grammar and possibly content that I did not discover before finalizing the note.      Assessment/Plan  1. Chronic allergic rhinitis  fluticasone (FLONASE) 50 MCG/ACT nasal spray   2. Bipolar affective disorder, current episode hypomanic (HCC)  REFERRAL TO PSYCHIATRY   3. Social anxiety disorder  REFERRAL TO PSYCHIATRY   4. Need for vaccination     Plan: 1.  Psychiatry referral for bipolar mood disorder, anxiety disorder  2.  Kenalog 60 mg IM today  3.  May continue nasal fluticasone as needed  4.  Revisit with me in 1 month

## 2021-02-12 NOTE — ASSESSMENT & PLAN NOTE
Patient reports a long history of psychiatric care being treated with numerous medications.  He does recall Prozac being one of them which seemed to irritate his mood.  Does not recall other medications.  He is currently seeing Chris Mccormick, psychology.  Is open to seeing psychiatry.  Denies suicidal ideation, confusion.

## 2023-04-12 NOTE — PROGRESS NOTES
Subjective:     CC:   Chief Complaint   Patient presents with   • Seasonal Allergies     Kenalog shot   • Immunizations     Hepatitis series       HPI:   Jam Youngblood is a 30 y.o. male who presents for annual exam    Last Colorectal Cancer Screening: NA  Last Tdap: 02/02/2018  Received HPV series: No  Hx STDs: No    Exercise: no regular exercise, sedentary  Diet: regular    He  has a past medical history of Depression and Social anxiety disorder (8/7/2019). He also has no past medical history of Anemia, GERD (gastroesophageal reflux disease), or Hypertension.  He  has a past surgical history that includes hydrocelectomy child; endoscopy; and dental extraction(s).    Family History   Problem Relation Age of Onset   • Hypertension Father    • Psychiatric Illness Mother    • Cancer Neg Hx    • Diabetes Neg Hx    • Hyperlipidemia Neg Hx      Social History     Tobacco Use   • Smoking status: Never Smoker   • Smokeless tobacco: Never Used   Substance Use Topics   • Alcohol use: Yes     Frequency: Monthly or less   • Drug use: No     He  reports never being sexually active.    Patient Active Problem List    Diagnosis Date Noted   • Hearing loss of right ear 08/07/2019   • Hordeolum externum of right upper eyelid 08/07/2019   • Social anxiety disorder 08/07/2019   • Chronic allergic rhinitis 01/30/2019   • Rib pain on left side 02/02/2018   • Need for vaccination 02/02/2018   • Bipolar disease, chronic (HCC) 12/14/2016   • Preventative health care 12/14/2016   • Delayed gastric emptying 05/20/2014     Current Outpatient Medications   Medication Sig Dispense Refill   • fluticasone (FLONASE) 50 MCG/ACT nasal spray 2 sprays in each nostril qd 16 g 2     No current facility-administered medications for this visit.      No Known Allergies    Review of Systems   Constitutional: Negative for fever, chills and malaise/fatigue.   HENT: Negative for congestion.    Eyes: Negative for pain.   Respiratory: Negative for cough and  "shortness of breath.    Cardiovascular: Negative for chest pain and leg swelling.   Gastrointestinal: Negative for nausea, vomiting, abdominal pain and diarrhea.   Genitourinary: Negative for dysuria and hematuria.   Skin: Negative for rash.   Neurological: Negative for dizziness, focal weakness and headaches.   Endo/Heme/Allergies: Does not bruise/bleed easily.   Psychiatric/Behavioral: Negative for depression.  The patient is not nervous/anxious.      Objective:   /72 (BP Location: Left arm, Patient Position: Sitting, BP Cuff Size: Adult)   Pulse 85   Temp 37.7 °C (99.8 °F) (Temporal)   Ht 1.753 m (5' 9\")   Wt 79.8 kg (176 lb)   SpO2 95%   BMI 25.99 kg/m²      Wt Readings from Last 4 Encounters:   10/21/20 79.8 kg (176 lb)   08/07/19 75.7 kg (166 lb 12.8 oz)   01/30/19 73 kg (161 lb)   02/02/18 68.7 kg (151 lb 6.4 oz)           Physical Exam:  Constitutional: Well-developed and well-nourished. Not diaphoretic. No distress.   Skin: Skin is warm and dry. No rash noted.  Head: Atraumatic without lesions.  Eyes: Conjunctivae and extraocular motions are normal. Pupils are equal, round, and reactive to light. No scleral icterus.   Ears:  External ears unremarkable. Tympanic membranes clear and intact.  Nose: Nares patent. Septum midline. Turbinates without erythema nor edema. No discharge.   Mouth/Throat: Tongue normal. Oropharynx is clear and moist. Posterior pharynx without erythema or exudates.  Neck: Supple, trachea midline. Normal range of motion. No thyromegaly present. No lymphadenopathy--cervical or supraclavicular.  Cardiovascular: Regular rate and rhythm, S1 and S2 without murmur, rubs, or gallops.    Abdomen: Soft, non tender, and without distention. Active bowel sounds in all four quadrants. No rebound, guarding, masses or HSM.    Extremities: No cyanosis, clubbing, erythema, nor edema. Distal pulses intact and symmetric.   Musculoskeletal: All major joints AROM full in all directions without " pain.  Neurological: Alert and oriented x 3. Grossly non-focal. Strength and sensation grossly intact. DTRs 2+/3 and symmetric.   Psychiatric:  Behavior, mood, and affect are appropriate.      Assessment and Plan:     1. Annual physical exam    Health maintenance:    Labs per orders  Immunizations per orders  Patient counseled about skin care, diet, supplements, and exercise.  Discussed diet and exercise     - Comp Metabolic Panel; Future  - CBC WITHOUT DIFFERENTIAL; Future  - Lipid Profile; Future  - TSH+FREE T4  - T3 FREE; Future  - VITAMIN D,25 HYDROXY; Future    2. Chronic allergic rhinitis  - fluticasone (FLONASE) 50 MCG/ACT nasal spray; 2 sprays in each nostril qd  Dispense: 16 g; Refill: 2    3. Needs flu shot  - Influenza Vaccine Quad Injection (PF)    4. Need for vaccination  - Hepatitis B Vaccine Adult IM        Follow-up: Return in about 6 months (around 4/21/2021) for Reevaluation, labs.   [email or web address]